# Patient Record
Sex: MALE | Race: WHITE | NOT HISPANIC OR LATINO
[De-identification: names, ages, dates, MRNs, and addresses within clinical notes are randomized per-mention and may not be internally consistent; named-entity substitution may affect disease eponyms.]

---

## 2022-09-14 ENCOUNTER — TRANSCRIPTION ENCOUNTER (OUTPATIENT)
Age: 77
End: 2022-09-14

## 2022-09-14 NOTE — ASU PATIENT PROFILE, ADULT - NSICDXPASTMEDICALHX_GEN_ALL_CORE_FT
PAST MEDICAL HISTORY:  Elevated cholesterol     Gout     HTN (hypertension)     PVCs (premature ventricular contractions) cardiologist evaluation 1 week ago    Vasovagal syncopes

## 2022-09-14 NOTE — ASU PATIENT PROFILE, ADULT - NS PREOP UNDERSTANDS INFO
No solid food or milk after 9:30 pm tonight, clear liquids till 4:30 am on DOS. Bring photo id card, insurance card , vaccination card. no contact lenses or jewelry, Escort to bring photo ID, proof of Covid vaccine or a negative Covid PCR test within 3 DOS, address and phone # reviewed with pt./yes

## 2022-09-14 NOTE — PRE PROCEDURE NOTE - PRE PROCEDURE EVALUATION
PRE ADMISSION NOTE    Diagnosis:   Deviated nasal septum, chronic sinusitis, nasal polyps, headaches, asymmetric hearing loss, turbinate hypertrophy    Planned Surgical Procedures:   SMR-CT guided FESS, turbinate reduction, possible propel implants  Indications for surgery and specialty history    77 year old male complains of chronic nasal congestion with headaches, ear pressure and post nasal drip and rhinorrhea.  history of recurrent sinusitis and resitent to routine meds, steroid nasal sprays and antibiotics.    PE: deviated septum, nasal polyps bilaterally, turbinate hypertrophy  CT scan Deviated nasal septum ,  chronic sinusitis, nasal-sinus polyps, huge turbinates with obstruction    Medical History:  hypertension, arrythmia history, reflux esophagitis, urinary problems, knee surgery, eye surgery    Medications:  atenolol, amlodipine, plavix ( in place of ASA due to his stomach problems), lipitor      Allergies  none knonw  No Known Allergies    Intolerances    Special Comments:    Post op first visit given date and time: FRiday 7:45 am Sept 16, 2022    Patient called the evening before surgery: yes    Patient given all postop instructions and medications in advance of surgery: yes      Consent in chart      Discussed all risks, benefits, complications, problems, realistic expectations, chance of recurrence, possible need for further surgery, need for patient follow-up, postop course etc discussed and fully understood.        All questions answered

## 2022-09-15 ENCOUNTER — TRANSCRIPTION ENCOUNTER (OUTPATIENT)
Age: 77
End: 2022-09-15

## 2022-09-15 ENCOUNTER — RESULT REVIEW (OUTPATIENT)
Age: 77
End: 2022-09-15

## 2022-09-15 ENCOUNTER — OUTPATIENT (OUTPATIENT)
Dept: OUTPATIENT SERVICES | Facility: HOSPITAL | Age: 77
LOS: 1 days | Discharge: HOME | End: 2022-09-15

## 2022-09-15 VITALS
OXYGEN SATURATION: 99 % | SYSTOLIC BLOOD PRESSURE: 164 MMHG | HEART RATE: 88 BPM | HEIGHT: 71 IN | RESPIRATION RATE: 14 BRPM | DIASTOLIC BLOOD PRESSURE: 73 MMHG | WEIGHT: 197.53 LBS | TEMPERATURE: 98 F

## 2022-09-15 VITALS
RESPIRATION RATE: 16 BRPM | HEART RATE: 91 BPM | SYSTOLIC BLOOD PRESSURE: 147 MMHG | OXYGEN SATURATION: 96 % | DIASTOLIC BLOOD PRESSURE: 77 MMHG

## 2022-09-15 DIAGNOSIS — Z98.890 OTHER SPECIFIED POSTPROCEDURAL STATES: Chronic | ICD-10-CM

## 2022-09-15 DIAGNOSIS — Z98.49 CATARACT EXTRACTION STATUS, UNSPECIFIED EYE: Chronic | ICD-10-CM

## 2022-09-15 PROCEDURE — 88305 TISSUE EXAM BY PATHOLOGIST: CPT | Mod: 26

## 2022-09-15 PROCEDURE — 88341 IMHCHEM/IMCYTCHM EA ADD ANTB: CPT | Mod: 26

## 2022-09-15 PROCEDURE — 88342 IMHCHEM/IMCYTCHM 1ST ANTB: CPT | Mod: 26

## 2022-09-15 PROCEDURE — 88311 DECALCIFY TISSUE: CPT | Mod: 26

## 2022-09-15 PROCEDURE — 88300 SURGICAL PATH GROSS: CPT | Mod: 26,59

## 2022-09-15 DEVICE — STENT DRUG ELUTING SINUS INTERSECT ENT PROPEL MINI 16MM: Type: IMPLANTABLE DEVICE | Site: BILATERAL | Status: FUNCTIONAL

## 2022-09-15 RX ORDER — ACETAMINOPHEN 500 MG
1000 TABLET ORAL ONCE
Refills: 0 | Status: COMPLETED | OUTPATIENT
Start: 2022-09-15 | End: 2022-09-15

## 2022-09-15 RX ORDER — OXYCODONE HYDROCHLORIDE 5 MG/1
5 TABLET ORAL ONCE
Refills: 0 | Status: DISCONTINUED | OUTPATIENT
Start: 2022-09-15 | End: 2022-09-15

## 2022-09-15 RX ORDER — SODIUM CHLORIDE 9 MG/ML
500 INJECTION, SOLUTION INTRAVENOUS ONCE
Refills: 0 | Status: COMPLETED | OUTPATIENT
Start: 2022-09-15 | End: 2022-09-15

## 2022-09-15 RX ORDER — CLOPIDOGREL BISULFATE 75 MG/1
1 TABLET, FILM COATED ORAL
Qty: 0 | Refills: 0 | DISCHARGE

## 2022-09-15 RX ORDER — HYDROMORPHONE HYDROCHLORIDE 2 MG/ML
0.25 INJECTION INTRAMUSCULAR; INTRAVENOUS; SUBCUTANEOUS
Refills: 0 | Status: DISCONTINUED | OUTPATIENT
Start: 2022-09-15 | End: 2022-09-15

## 2022-09-15 RX ORDER — ATORVASTATIN CALCIUM 80 MG/1
1 TABLET, FILM COATED ORAL
Qty: 0 | Refills: 0 | DISCHARGE

## 2022-09-15 RX ORDER — FENTANYL CITRATE 50 UG/ML
50 INJECTION INTRAVENOUS
Refills: 0 | Status: DISCONTINUED | OUTPATIENT
Start: 2022-09-15 | End: 2022-09-15

## 2022-09-15 RX ORDER — SODIUM CHLORIDE 9 MG/ML
500 INJECTION, SOLUTION INTRAVENOUS
Refills: 0 | Status: DISCONTINUED | OUTPATIENT
Start: 2022-09-15 | End: 2022-09-15

## 2022-09-15 RX ORDER — ATENOLOL 25 MG/1
0 TABLET ORAL
Qty: 0 | Refills: 0 | DISCHARGE

## 2022-09-15 RX ORDER — AMLODIPINE BESYLATE 2.5 MG/1
1 TABLET ORAL
Qty: 0 | Refills: 0 | DISCHARGE

## 2022-09-15 RX ORDER — ALLOPURINOL 300 MG
1 TABLET ORAL
Qty: 0 | Refills: 0 | DISCHARGE

## 2022-09-15 RX ADMIN — Medication 1000 MILLIGRAM(S): at 14:57

## 2022-09-15 RX ADMIN — OXYCODONE HYDROCHLORIDE 5 MILLIGRAM(S): 5 TABLET ORAL at 13:04

## 2022-09-15 RX ADMIN — Medication 1000 MILLIGRAM(S): at 06:28

## 2022-09-15 RX ADMIN — OXYCODONE HYDROCHLORIDE 5 MILLIGRAM(S): 5 TABLET ORAL at 13:34

## 2022-09-15 RX ADMIN — Medication 1000 MILLIGRAM(S): at 14:27

## 2022-09-15 RX ADMIN — SODIUM CHLORIDE 1000 MILLILITER(S): 9 INJECTION, SOLUTION INTRAVENOUS at 14:58

## 2022-09-15 NOTE — PROGRESS NOTE ADULT - SUBJECTIVE AND OBJECTIVE BOX
Post op Check in Recovery Room  S/P SMR-CT guided FESS and Turb reduction  Complaints : none  Vital Signs: VSS  Eye Exam: Bilateral Full EOM and grossly normal vision  Lungs: clear  Dressing : dry  Stable  Instructions  Meds already given yesterday to patient  RTO 1 day: patient  has time and date at my office

## 2022-09-15 NOTE — BRIEF OPERATIVE NOTE - NSICDXBRIEFPROCEDURE_GEN_ALL_CORE_FT
PROCEDURES:  SMR - Submucous resection 15-Sep-2022 07:31:54  Andres Bruce  FESS, adult 15-Sep-2022 07:32:02  Andres Bruce  CT guided localization 15-Sep-2022 07:32:15  Andres Bruce  Surgical fracture, nasal turbinate 15-Sep-2022 07:32:27  Andres Bruce  Coblation of nasal turbinate 15-Sep-2022 07:32:38  Anders Bruce

## 2022-09-15 NOTE — ASU DISCHARGE PLAN (ADULT/PEDIATRIC) - PROVIDER TOKENS
PROVIDER:[TOKEN:[[id number]],SCHEDULEDAPPT:[09/16/2022],SCHEDULEDAPPTTIME:[07:45 AM],ESTABLISHEDPATIENT:[T]]

## 2022-09-15 NOTE — ASU DISCHARGE PLAN (ADULT/PEDIATRIC) - NS MD DC FALL RISK RISK
For information on Fall & Injury Prevention, visit: [email or web address] OR  [email or web address] OR  https://www.cdc.gov/steadi/patient.html

## 2022-09-15 NOTE — ASU DISCHARGE PLAN (ADULT/PEDIATRIC) - CARE PROVIDER_API CALL
Andres Bruce)  Otolaryngology  [address] [facility] [address] [place] [address]  Phone: [phone]  Fax: [phone]  Established Patient  Scheduled Appointment: 09/16/2022 07:45 AM

## 2022-09-15 NOTE — BRIEF OPERATIVE NOTE - OPERATION/FINDINGS
1. severe deviated septum  2. nasal sinus polyposis  3. chronic sinusitis  4. right blocked shannan cell  5. turbinate hypertrophy

## 2022-09-15 NOTE — BRIEF OPERATIVE NOTE - NSICDXBRIEFPREOP_GEN_ALL_CORE_FT
PRE-OP DIAGNOSIS:  Chronic sinusitis 15-Sep-2022 07:30:18  Andres Bruce  Deviated nasal septum 15-Sep-2022 07:29:51  Andres Bruce  Nasal polyps 15-Sep-2022 07:30:02  Andres Bruce  Nasal turbinate hypertrophy 15-Sep-2022 07:30:27  Andres Bruce  Chronic headaches 15-Sep-2022 07:30:35  Andres Bruce

## 2022-09-15 NOTE — BRIEF OPERATIVE NOTE - NSICDXBRIEFPOSTOP_GEN_ALL_CORE_FT
POST-OP DIAGNOSIS:  Deviated nasal septum 15-Sep-2022 07:30:46  Andres Bruce  Chronic headaches 15-Sep-2022 07:31:34  Andres Bruce  Nasal polyps 15-Sep-2022 07:30:55  Andres Bruce  Nasal turbinate hypertrophy 15-Sep-2022 07:31:24  Andres Bruce

## 2022-09-30 LAB — SURGICAL PATHOLOGY STUDY: SIGNIFICANT CHANGE UP

## 2022-10-17 NOTE — ASU PREOP CHECKLIST - SURGICAL CONSENT
- current smoke 1/2 pack a day from 1 pack a day  - Plan to cut to down to 1/4 pack a day by next visit  - Patient is not ready to start chantix 0.5 mg TID as previously ordered so it will be discontinued for now  - Educated on smoking cessation for 4 mintues   done

## 2022-12-09 PROBLEM — I10 ESSENTIAL (PRIMARY) HYPERTENSION: Chronic | Status: ACTIVE | Noted: 2022-09-14

## 2022-12-09 PROBLEM — R55 SYNCOPE AND COLLAPSE: Chronic | Status: ACTIVE | Noted: 2022-09-14

## 2022-12-09 PROBLEM — M10.9 GOUT, UNSPECIFIED: Chronic | Status: ACTIVE | Noted: 2022-09-14

## 2022-12-09 PROBLEM — E78.00 PURE HYPERCHOLESTEROLEMIA, UNSPECIFIED: Chronic | Status: ACTIVE | Noted: 2022-09-14

## 2022-12-09 PROBLEM — I49.3 VENTRICULAR PREMATURE DEPOLARIZATION: Chronic | Status: ACTIVE | Noted: 2022-09-14

## 2022-12-14 ENCOUNTER — APPOINTMENT (OUTPATIENT)
Dept: OPHTHALMOLOGY | Facility: CLINIC | Age: 77
End: 2022-12-14

## 2022-12-14 ENCOUNTER — NON-APPOINTMENT (OUTPATIENT)
Age: 77
End: 2022-12-14

## 2022-12-24 ENCOUNTER — OFFICE (OUTPATIENT)
Dept: URBAN - METROPOLITAN AREA CLINIC 63 | Facility: CLINIC | Age: 77
Setting detail: OPHTHALMOLOGY
End: 2022-12-24
Payer: MEDICARE

## 2022-12-24 DIAGNOSIS — H02.832: ICD-10-CM

## 2022-12-24 DIAGNOSIS — H35.363: ICD-10-CM

## 2022-12-24 DIAGNOSIS — H02.403: ICD-10-CM

## 2022-12-24 DIAGNOSIS — H43.813: ICD-10-CM

## 2022-12-24 DIAGNOSIS — H02.835: ICD-10-CM

## 2022-12-24 PROCEDURE — 92012 INTRM OPH EXAM EST PATIENT: CPT | Performed by: STUDENT IN AN ORGANIZED HEALTH CARE EDUCATION/TRAINING PROGRAM

## 2022-12-24 ASSESSMENT — LID POSITION - DERMATOCHALASIS
OD_DERMATOCHALASIS: RLL 2+
OS_DERMATOCHALASIS: LLL 2+

## 2022-12-24 ASSESSMENT — TONOMETRY
OS_IOP_MMHG: 17
OD_IOP_MMHG: 16

## 2022-12-24 ASSESSMENT — REFRACTION_CURRENTRX
OD_OVR_VA: 20/
OS_OVR_VA: 20/
OS_SPHERE: +1.50
OS_CYLINDER: SPH
OD_CYLINDER: SPH
OS_AXIS: 90
OD_SPHERE: +1.50
OD_AXIS: 90

## 2022-12-24 ASSESSMENT — REFRACTION_AUTOREFRACTION
OD_SPHERE: +1.25
OD_AXIS: 075
OS_CYLINDER: -1.50
OS_SPHERE: +1.50
OS_AXIS: 077
OD_CYLINDER: -1.50

## 2022-12-24 ASSESSMENT — REFRACTION_MANIFEST
OS_CYLINDER: +1.00
OD_ADD: +2.25
OS_AXIS: 165
OS_VA2: 20/25(J1)
OD_CYLINDER: SPHERE
OS_SPHERE: PLANO
OD_SPHERE: PLANO
OS_AXIS: 155
OS_VA1: 20/25-2
OD_AXIS: 10
OD_VA1: 20/25
OS_VA1: 20/30
OD_CYLINDER: +0.25
OD_VA2: 20/25(J1)
OD_VA1: 20/25
OS_SPHERE: -0.50
OS_ADD: +2.25
OS_CYLINDER: +1.00
OS_ADD: +1.75
OD_SPHERE: PL
OD_ADD: +1.75

## 2022-12-24 ASSESSMENT — VISUAL ACUITY
OS_BCVA: 20/25
OD_BCVA: 20/25-1

## 2022-12-24 ASSESSMENT — AXIALLENGTH_DERIVED
OS_AL: 23.2748
OS_AL: 22.9939
OD_AL: 23.8592

## 2022-12-24 ASSESSMENT — MACULA - DRUSEN
OS_DRUSEN: 1+
OD_DRUSEN: 1+

## 2022-12-24 ASSESSMENT — LID POSITION - PTOSIS
OD_PTOSIS: RUL 3+
OS_PTOSIS: LUL 2+

## 2022-12-24 ASSESSMENT — KERATOMETRY
OD_AXISANGLE_DEGREES: 016
OS_K2POWER_DIOPTERS: 44.75
OS_AXISANGLE_DEGREES: 158
OD_K1POWER_DIOPTERS: 41.25
OS_K1POWER_DIOPTERS: 44.00
OD_K2POWER_DIOPTERS: 43.25

## 2022-12-24 ASSESSMENT — SPHEQUIV_DERIVED
OS_SPHEQUIV: 0
OS_SPHEQUIV: 0.75
OD_SPHEQUIV: 0.5

## 2023-08-18 NOTE — ASU PATIENT PROFILE, ADULT - FALL HARM RISK - FALLEN IN PAST
Detail Level: Detailed Asa Clasification: I Preoperative Diagnosis (For...Diagnosis Name): Surgery for Site Marked?: Yes Proposed Procedure: Complex Repair Anesthesia #1 Type: 1% lidocaine with epinephrine Anesthesia #1 Volume In Cc: 2 Anesthesia #2 Volume In Cc: 0 Time 'time-Out' Performed: 10:10 am Time Discharged: 10:40 am Surgeon: Barbara De Guzman MD Dicharge Condition: Stable Patient Discharged To: Self No

## 2024-07-10 NOTE — PRE-ANESTHESIA EVALUATION ADULT - NSANTHINPATMEDSFT_GEN_ALL_CORE
Destiny Bueno is calling to request a refill on the following medication(s):    Last Visit Date (If Applicable):  6/28/2024    Next Visit Date:    Visit date not found    Medication Request:  Requested Prescriptions     Pending Prescriptions Disp Refills    Continuous Glucose Sensor (DEXCOM G6 SENSOR) MISC 3 each 5     Sig: use as directed every week              
Noted

## (undated) DEVICE — SOL ANTI FOG

## (undated) DEVICE — Device

## (undated) DEVICE — GLV 7.5 PROTEXIS (WHITE)

## (undated) DEVICE — DRSG GAUZE SPONGE 2X2" STERILE

## (undated) DEVICE — BLADE SCALPEL SAFETY #15 WITH PLASTIC GREEN HANDLE

## (undated) DEVICE — DRSG 2X2

## (undated) DEVICE — TONGUE DEPRESSOR

## (undated) DEVICE — DRSG TELFA 3 X 8

## (undated) DEVICE — SPHERE MARKER FOR BRAIN LAB IMAGE (3 SPHERES)

## (undated) DEVICE — ACCLARENT SET INFLATION DEVICE

## (undated) DEVICE — SUT SILK 2-0 18" FS

## (undated) DEVICE — S&N ARTHROCARE ENT WAND REFLEX ULTRA PTR

## (undated) DEVICE — WARMING BLANKET LOWER ADULT

## (undated) DEVICE — TUBING DIEGO DECLOG

## (undated) DEVICE — MARKING PEN W RULER

## (undated) DEVICE — ELCTR BOVIE SUCTION 8FR 6"

## (undated) DEVICE — DRSG FOAM STAMMBERGER SINUS

## (undated) DEVICE — SHAVER BLADE OLYMPUS DIEGO ELITE 4MM STRAIGHT

## (undated) DEVICE — PACK RHINOPLASTY

## (undated) DEVICE — NDL SPINAL 25G X 3.5" (BLUE)

## (undated) DEVICE — MEDIA ESWAB W/ REGULAR FLOCKED PK/50

## (undated) DEVICE — BRAINLAB HEADBAND DISP

## (undated) DEVICE — DRSG MEROCEL STANDARD NO STRING 8CM X 2CM

## (undated) DEVICE — DRSG TAPE MICROPORE SURGICAL TAPE .5"X10 YDS TAN

## (undated) DEVICE — DRAPE MAYO STAND 23"

## (undated) DEVICE — VAGINAL PACKING 2"

## (undated) DEVICE — SLV COMPRESSION KNEE MED

## (undated) DEVICE — SUT CHROMIC 4-0 18" G-3